# Patient Record
Sex: MALE | Race: WHITE
[De-identification: names, ages, dates, MRNs, and addresses within clinical notes are randomized per-mention and may not be internally consistent; named-entity substitution may affect disease eponyms.]

---

## 2018-10-26 ENCOUNTER — HOSPITAL ENCOUNTER (OUTPATIENT)
Dept: HOSPITAL 92 - TBSIIMAG | Age: 68
Discharge: HOME | End: 2018-10-26
Attending: ORTHOPAEDIC SURGERY
Payer: MEDICARE

## 2018-10-26 DIAGNOSIS — M25.511: Primary | ICD-10-CM

## 2018-10-26 DIAGNOSIS — M71.311: ICD-10-CM

## 2018-10-26 DIAGNOSIS — M19.011: ICD-10-CM

## 2018-10-26 DIAGNOSIS — S46.911A: ICD-10-CM

## 2018-10-26 DIAGNOSIS — S43.401A: ICD-10-CM

## 2018-10-26 DIAGNOSIS — M25.411: ICD-10-CM

## 2018-10-26 NOTE — MRI
MRI RIGHT SHOULDER WITHOUT CONTRAST:

 

Date:  10/26/18 

 

HISTORY:  

M25.511 right shoulder pain. Pain for 2 months. 

 

COMPARISON:  

None. 

 

FINDINGS:

 

Biceps Tendon:

There is loss of volume of the biceps tendon at the level of the intertrabecular groove with tear of 
the superior glenohumeral ligament portion of the biceps pulley with perching of the lesser tuberosit
y. Extensive interstitial tearing involves the intraarticular tendon as it extends to the supraglenoi
d tubercle. 

 

Labrum:

There is circumferential labral tearing with loss of volume of the superior labrum. 

 

Rotator Cuff: 

There is a full thickness tear of the anterior 7 mm of the supraspinatus tendon with high grade bursa
l surface tearing 1 cm posterior to this. The anterior 7 mm fibers are retracted only 6 mm. Extensive
 tendinosis of the remainder of the supraspinatus tendon. Mild tendinosis infraspinatus tendon, altho
ugh there is interstitial delamination, hidden, of the anterior 1 cm of the infraspinatus tendon. The
re is deep undersurface partial tearing of the craniad 30% fibers of subscapularis. 

 

Bones: 

Type I acromion. Moderate degenerative disease acromioclavicular joint. 

 

Subcortical cyst formation of the anterior inferior glenoid. 

 

Extensive paralabral cyst formation of the anterior inferior labrum measuring 1.8 cm in craniocaudad 
dimension x 7.0 mm in transverse dimension. 

 

Cartilage:

There are a few high grade cartilage fissures of the central glenoid and superior glenoid. 

 

Muscles:

Muscle bulk is normal. No atrophy. 

 

IMPRESSION: 

1.  Perching of the biceps tendon upon the lesser tuberosity at the intertubecular groove through a t
ear of the superior glenohumeral ligament portion of the biceps pulley, as well as deep undersurface 
partial tear of the subscapularis. 

 

2.  Full thickness tear of the anterior 7 mm supraspinatus tendon from the footprint, only retracted 
6 mm. There is also high grade bursal surface fraying of the remainder of the supraspinatus tendon po
sterior to this, approximately 75% thickness, with hidden interstitial delamination of the anterior 1
 cm of the infraspinatus tendon. 

 

3.  Small subacromial/subdeltoid bursa effusion. 

 

4.  Mild synovitis of the rotator interval, as well as mild thickening of the axillary pouch and infe
rior glenohumeral ligament. 

 

5.  Circumferential labral tearing with anterior inferior paralabral cyst measuring 1.7 x 0.7 cm. 

 

 

POS: OFF